# Patient Record
Sex: FEMALE | NOT HISPANIC OR LATINO | Employment: FULL TIME | URBAN - METROPOLITAN AREA
[De-identification: names, ages, dates, MRNs, and addresses within clinical notes are randomized per-mention and may not be internally consistent; named-entity substitution may affect disease eponyms.]

---

## 2024-02-12 ENCOUNTER — APPOINTMENT (OUTPATIENT)
Dept: PRIMARY CARE | Facility: CLINIC | Age: 49
End: 2024-02-12
Payer: COMMERCIAL

## 2024-02-13 PROBLEM — J30.81 ALLERGIC RHINITIS DUE TO ANIMAL HAIR AND DANDER: Status: ACTIVE | Noted: 2024-02-13

## 2024-02-13 PROBLEM — H52.4 BILATERAL PRESBYOPIA: Status: ACTIVE | Noted: 2024-02-13

## 2024-02-13 PROBLEM — L25.9 DERMATITIS VENENATA: Status: ACTIVE | Noted: 2024-02-13

## 2024-02-13 PROBLEM — R10.2 PELVIC PAIN: Status: ACTIVE | Noted: 2024-02-13

## 2024-02-13 PROBLEM — F41.9 ANXIETY: Status: ACTIVE | Noted: 2024-02-13

## 2024-02-13 PROBLEM — D49.2 NEOPLASM OF UNSPECIFIED BEHAVIOR OF BONE, SOFT TISSUE, AND SKIN: Status: ACTIVE | Noted: 2022-03-29

## 2024-02-13 PROBLEM — L70.0 ACNE VULGARIS: Status: ACTIVE | Noted: 2022-03-29

## 2024-02-13 PROBLEM — H04.123 BILATERAL DRY EYES: Status: ACTIVE | Noted: 2024-02-13

## 2024-02-13 PROBLEM — T78.40XA ALLERGIC: Status: ACTIVE | Noted: 2024-02-13

## 2024-02-13 PROBLEM — H52.203 ASTIGMATISM, BILATERAL: Status: ACTIVE | Noted: 2024-02-13

## 2024-02-13 PROBLEM — J30.1 ALLERGIC REACTION TO INHALED POLLEN: Status: ACTIVE | Noted: 2024-02-13

## 2024-02-20 ENCOUNTER — APPOINTMENT (OUTPATIENT)
Dept: PRIMARY CARE | Facility: CLINIC | Age: 49
End: 2024-02-20
Payer: COMMERCIAL

## 2024-04-16 ENCOUNTER — APPOINTMENT (OUTPATIENT)
Dept: OPHTHALMOLOGY | Facility: CLINIC | Age: 49
End: 2024-04-16
Payer: COMMERCIAL

## 2025-01-06 ENCOUNTER — APPOINTMENT (OUTPATIENT)
Dept: OPHTHALMOLOGY | Facility: CLINIC | Age: 50
End: 2025-01-06
Payer: COMMERCIAL

## 2025-01-06 DIAGNOSIS — H04.123 BILATERAL DRY EYES: Primary | ICD-10-CM

## 2025-01-06 DIAGNOSIS — H52.223 REGULAR ASTIGMATISM OF BOTH EYES: ICD-10-CM

## 2025-01-06 DIAGNOSIS — H52.4 BILATERAL PRESBYOPIA: ICD-10-CM

## 2025-01-06 PROCEDURE — 92015 DETERMINE REFRACTIVE STATE: CPT | Performed by: OPTOMETRIST

## 2025-01-06 PROCEDURE — 92014 COMPRE OPH EXAM EST PT 1/>: CPT | Performed by: OPTOMETRIST

## 2025-01-06 ASSESSMENT — REFRACTION_MANIFEST
OS_ADD: +1.50
OD_AXIS: 030
OS_SPHERE: +0.75
OS_ADD: +0.75
OS_CYLINDER: -0.50
OD_SPHERE: +0.50
OD_ADD: +1.50
OS_AXIS: 160
OD_AXIS: 030
OD_CYLINDER: -0.50
OS_CYLINDER: -0.50
OD_ADD: +0.75
OS_AXIS: 160
OD_CYLINDER: -0.50
OD_SPHERE: +1.25
OS_SPHERE: +1.50

## 2025-01-06 ASSESSMENT — ENCOUNTER SYMPTOMS
CARDIOVASCULAR NEGATIVE: 0
RESPIRATORY NEGATIVE: 0
CONSTITUTIONAL NEGATIVE: 0
MUSCULOSKELETAL NEGATIVE: 0
NEUROLOGICAL NEGATIVE: 0
PSYCHIATRIC NEGATIVE: 0
ENDOCRINE NEGATIVE: 0
ALLERGIC/IMMUNOLOGIC NEGATIVE: 0
HEMATOLOGIC/LYMPHATIC NEGATIVE: 0
GASTROINTESTINAL NEGATIVE: 0
EYES NEGATIVE: 0

## 2025-01-06 ASSESSMENT — TONOMETRY
IOP_METHOD: GOLDMANN APPLANATION
OS_IOP_MMHG: 14
OD_IOP_MMHG: 14

## 2025-01-06 ASSESSMENT — CUP TO DISC RATIO
OS_RATIO: .45
OD_RATIO: .45

## 2025-01-06 ASSESSMENT — VISUAL ACUITY
OS_SC+: +1
METHOD: SNELLEN - LINEAR
OS_SC: 20/25
OD_SC: 20/20
OD_SC+: -2

## 2025-01-06 ASSESSMENT — REFRACTION_WEARINGRX
OS_ADD: +1.25
OS_AXIS: 180
OS_SPHERE: +0.50
OD_AXIS: 015
OD_SPHERE: +0.25
OS_CYLINDER: -0.50
OD_CYLINDER: -0.50
OD_ADD: +1.25

## 2025-01-06 ASSESSMENT — CONF VISUAL FIELD
OS_NORMAL: 1
OS_INFERIOR_NASAL_RESTRICTION: 0
OS_SUPERIOR_NASAL_RESTRICTION: 0
OS_INFERIOR_TEMPORAL_RESTRICTION: 0
METHOD: COUNTING FINGERS
OS_SUPERIOR_TEMPORAL_RESTRICTION: 0

## 2025-01-06 ASSESSMENT — SLIT LAMP EXAM - LIDS
COMMENTS: GOOD POSITION
COMMENTS: GOOD POSITION

## 2025-01-06 ASSESSMENT — EXTERNAL EXAM - LEFT EYE: OS_EXAM: NORMAL

## 2025-01-06 ASSESSMENT — EXTERNAL EXAM - RIGHT EYE: OD_EXAM: NORMAL

## 2025-01-06 NOTE — PROGRESS NOTES
A spectacle prescription was dispensed to be used as needed. Can use for reading and computer games.     Is interested in contact lenses and can RTC for this recommend  Cls.    DED and  coupon for Refresh provided.     The patient was asked to return to our clinic in one year or sooner if ocular or vision changes occur.

## 2025-06-30 ENCOUNTER — OFFICE VISIT (OUTPATIENT)
Dept: URGENT CARE | Age: 50
End: 2025-06-30
Payer: COMMERCIAL

## 2025-06-30 ENCOUNTER — ANCILLARY PROCEDURE (OUTPATIENT)
Dept: URGENT CARE | Age: 50
End: 2025-06-30
Payer: COMMERCIAL

## 2025-06-30 VITALS
HEART RATE: 63 BPM | SYSTOLIC BLOOD PRESSURE: 136 MMHG | RESPIRATION RATE: 19 BRPM | TEMPERATURE: 98 F | BODY MASS INDEX: 27.46 KG/M2 | DIASTOLIC BLOOD PRESSURE: 82 MMHG | OXYGEN SATURATION: 98 % | WEIGHT: 155 LBS | HEIGHT: 63 IN

## 2025-06-30 DIAGNOSIS — S69.91XA HAND INJURY, RIGHT, INITIAL ENCOUNTER: ICD-10-CM

## 2025-06-30 DIAGNOSIS — S69.91XA HAND INJURY, RIGHT, INITIAL ENCOUNTER: Primary | ICD-10-CM

## 2025-06-30 PROCEDURE — 3008F BODY MASS INDEX DOCD: CPT | Performed by: PHYSICIAN ASSISTANT

## 2025-06-30 PROCEDURE — 73130 X-RAY EXAM OF HAND: CPT | Mod: RIGHT SIDE | Performed by: PHYSICIAN ASSISTANT

## 2025-06-30 PROCEDURE — 99203 OFFICE O/P NEW LOW 30 MIN: CPT | Performed by: PHYSICIAN ASSISTANT

## 2025-06-30 PROCEDURE — 1036F TOBACCO NON-USER: CPT | Performed by: PHYSICIAN ASSISTANT

## 2025-06-30 ASSESSMENT — ENCOUNTER SYMPTOMS
FATIGUE: 0
SHORTNESS OF BREATH: 0
CHILLS: 0
NAUSEA: 0
VOMITING: 0
COUGH: 0
ABDOMINAL PAIN: 0
FEVER: 0
DIARRHEA: 0
CHEST TIGHTNESS: 0
WEAKNESS: 0
NUMBNESS: 0

## 2025-06-30 ASSESSMENT — PATIENT HEALTH QUESTIONNAIRE - PHQ9
SUM OF ALL RESPONSES TO PHQ9 QUESTIONS 1 AND 2: 0
1. LITTLE INTEREST OR PLEASURE IN DOING THINGS: NOT AT ALL
2. FEELING DOWN, DEPRESSED OR HOPELESS: NOT AT ALL

## 2025-06-30 NOTE — PROGRESS NOTES
"Subjective   Patient ID: April Rosalino is a 50 y.o. female. They present today with a chief complaint of Hand Injury (Right hand, yesterday, twist and had her body weight on it when she fell ).    History of Present Illness  Pt presented with acute right hand pain related to injury. Reports fell yesterday and used hand to prevent fall. Feels pain in base of right middle, 4th and 5th finger with swelling. Denies weakness, numbness/tingling. Pain worse with movement.       Hand Injury  Associated symptoms: no fatigue and no fever        Past Medical History  Allergies as of 06/30/2025    (No Known Allergies)       Prescriptions Prior to Admission[1]     Medical History[2]    Surgical History[3]     reports that she has never smoked. She has never been exposed to tobacco smoke. She has never used smokeless tobacco.    Review of Systems  Review of Systems   Constitutional:  Negative for chills, fatigue and fever.   Respiratory:  Negative for cough, chest tightness and shortness of breath.    Cardiovascular:  Negative for chest pain.   Gastrointestinal:  Negative for abdominal pain, diarrhea, nausea and vomiting.   Musculoskeletal:         Right hand pain   Neurological:  Negative for weakness and numbness.                                  Objective    Vitals:    06/30/25 1427   BP: 136/82   Pulse: 63   Resp: 19   Temp: 36.7 °C (98 °F)   SpO2: 98%   Weight: 70.3 kg (155 lb)   Height: 1.6 m (5' 3\")     No LMP recorded (lmp unknown). Patient has had a hysterectomy.    Physical Exam  Constitutional:       Appearance: Normal appearance.   HENT:      Head: Normocephalic and atraumatic.      Nose: Nose normal.   Cardiovascular:      Rate and Rhythm: Normal rate and regular rhythm.      Heart sounds: No murmur heard.  Pulmonary:      Effort: Pulmonary effort is normal.      Breath sounds: Normal breath sounds.   Abdominal:      General: Abdomen is flat.      Palpations: Abdomen is soft.   Musculoskeletal:         General: " Swelling, tenderness and signs of injury present.      Comments: Mild swelling and tenderness along the dorsal aspect of right 3rd to 5th metacarpal bone area, no open lesion, full ROM with mild pain   Skin:     General: Skin is warm and dry.   Neurological:      Mental Status: She is alert and oriented to person, place, and time.   Psychiatric:         Mood and Affect: Mood normal.         Procedures    Point of Care Test & Imaging Results from this visit  No results found for this visit on 06/30/25.   Imaging  XR hand right 3+ views  Result Date: 6/30/2025  1. No fracture or dislocation.   MACRO: None.   Signed by: Juan Patel 6/30/2025 2:56 PM Dictation workstation:   [id number]      Cardiology, Vascular, and Other Imaging  No other imaging results found for the past 2 days      Diagnostic study results (if any) were reviewed by Sabrina Love PA-C.    Assessment/Plan   Allergies, medications, history, and pertinent labs/EKGs/Imaging reviewed by Sabrina Love PA-C.     Medical Decision Making  X-ray no acute findings  Suspicious for soft tissue injury    Orders and Diagnoses  Diagnoses and all orders for this visit:  Hand injury, right, initial encounter  -     XR hand right 3+ views; Future      Medical Admin Record      Patient disposition: Home    Electronically signed by Sabrina Love PA-C  3:03 PM           [1] (Not in a hospital admission)   [2] History reviewed. No pertinent past medical history.  [3]   Past Surgical History:  Procedure Laterality Date    MOUTH SURGERY  06/16/2016    Oral Surgery Tooth Extraction    TUBAL LIGATION  06/16/2016    Tubal Ligation

## 2026-01-07 ENCOUNTER — APPOINTMENT (OUTPATIENT)
Dept: OPHTHALMOLOGY | Facility: CLINIC | Age: 51
End: 2026-01-07
Payer: COMMERCIAL